# Patient Record
Sex: MALE | URBAN - METROPOLITAN AREA
[De-identification: names, ages, dates, MRNs, and addresses within clinical notes are randomized per-mention and may not be internally consistent; named-entity substitution may affect disease eponyms.]

---

## 2019-10-30 ENCOUNTER — OFFICE VISIT (OUTPATIENT)
Dept: URBAN - METROPOLITAN AREA CLINIC 48 | Facility: CLINIC | Age: 74
End: 2019-10-30
Payer: MEDICARE

## 2019-10-30 DIAGNOSIS — E11.9 TYPE 2 DIABETES MELLITUS W/O COMPLICATION: ICD-10-CM

## 2019-10-30 PROCEDURE — 92004 COMPRE OPH EXAM NEW PT 1/>: CPT | Performed by: OPTOMETRIST

## 2019-10-30 ASSESSMENT — INTRAOCULAR PRESSURE
OS: 12
OD: 12

## 2019-10-30 NOTE — IMPRESSION/PLAN
Impression: Type 2 diabetes mellitus w/o complication: I65.2. Plan: Discussed with patient the importance of glucose control and ocular risk. 

Follow up annually with dilated fundus exam.

## 2019-10-30 NOTE — IMPRESSION/PLAN
Impression: Diplopia: H53.2. Stable x more than 1 year per pt. Park 3-step was inconclusive, unlikely a palsy. Vertical tropia measured 3.5 LBD, ortho horz. Plan: RTO to check prism. VF 30-2 to r/o any possible history of brain lesions.

## 2019-11-06 ENCOUNTER — TESTING ONLY (OUTPATIENT)
Dept: URBAN - METROPOLITAN AREA CLINIC 48 | Facility: CLINIC | Age: 74
End: 2019-11-06
Payer: MEDICARE

## 2019-11-06 PROCEDURE — 92083 EXTENDED VISUAL FIELD XM: CPT | Performed by: OPTOMETRIST

## 2019-11-11 ENCOUNTER — OFFICE VISIT (OUTPATIENT)
Dept: URBAN - METROPOLITAN AREA CLINIC 48 | Facility: CLINIC | Age: 74
End: 2019-11-11
Payer: MEDICARE

## 2019-11-11 DIAGNOSIS — H53.2 DIPLOPIA: Primary | ICD-10-CM

## 2019-11-11 PROCEDURE — 92012 INTRM OPH EXAM EST PATIENT: CPT | Performed by: OPTOMETRIST

## 2019-11-11 ASSESSMENT — VISUAL ACUITY
OD: 20/20-2
OS: 20/20

## 2019-11-11 NOTE — IMPRESSION/PLAN
Impression: Diplopia: H53.2. OU. Likely associated with tropia
minimal prism given Plan: Discussed with patient to have the new glasses made to help with diplopia. Added prism to the MRx, patient to follow up in a month for follow up.

## 2019-12-12 ENCOUNTER — OFFICE VISIT (OUTPATIENT)
Dept: URBAN - METROPOLITAN AREA CLINIC 48 | Facility: CLINIC | Age: 74
End: 2019-12-12
Payer: MEDICARE

## 2019-12-12 PROCEDURE — 92012 INTRM OPH EXAM EST PATIENT: CPT | Performed by: OPTOMETRIST

## 2019-12-12 ASSESSMENT — VISUAL ACUITY
OD: 20/20
OS: 20/20

## 2020-11-02 ENCOUNTER — OFFICE VISIT (OUTPATIENT)
Dept: URBAN - METROPOLITAN AREA CLINIC 48 | Facility: CLINIC | Age: 75
End: 2020-11-02
Payer: MEDICARE

## 2020-11-02 DIAGNOSIS — H43.813 VITREOUS DEGENERATION, BILATERAL: ICD-10-CM

## 2020-11-02 DIAGNOSIS — H35.371 PUCKERING OF MACULA, RIGHT EYE: ICD-10-CM

## 2020-11-02 PROCEDURE — 92014 COMPRE OPH EXAM EST PT 1/>: CPT | Performed by: OPTOMETRIST

## 2020-11-02 ASSESSMENT — INTRAOCULAR PRESSURE
OD: 13
OS: 14

## 2020-11-02 NOTE — IMPRESSION/PLAN
Impression: Vitreous degeneration, bilateral: H43.813. Plan: Discussed with patient risk of RD/retinal breaks. RTC 1 year DFE or PRN for increase in flashes/floaters or change in vision.

## 2020-11-02 NOTE — IMPRESSION/PLAN
Impression: Puckering of macula, right eye: H35.371. Plan: Discussed with patient. Will continue to monitor.

## 2022-03-25 NOTE — IMPRESSION/PLAN
Impression: Type 2 diabetes mellitus w/o complication: O85.4. Plan: Discussed with patient the importance of glucose control and ocular risk. 

Follow up annually with dilated fundus exam.
 Rx needs to be reordered prior to initiating PA. Will send to covering provider, MELISSA Ferrell, to review.   Once Rx is approved please send back to [facility] pool and we will initiate PA.